# Patient Record
Sex: FEMALE | Race: WHITE | Employment: PART TIME | URBAN - METROPOLITAN AREA
[De-identification: names, ages, dates, MRNs, and addresses within clinical notes are randomized per-mention and may not be internally consistent; named-entity substitution may affect disease eponyms.]

---

## 2020-10-27 ENCOUNTER — OFFICE VISIT (OUTPATIENT)
Dept: URGENT CARE | Facility: URGENT CARE | Age: 20
End: 2020-10-27
Payer: COMMERCIAL

## 2020-10-27 VITALS
HEART RATE: 117 BPM | WEIGHT: 161.1 LBS | TEMPERATURE: 99.3 F | SYSTOLIC BLOOD PRESSURE: 132 MMHG | OXYGEN SATURATION: 100 % | DIASTOLIC BLOOD PRESSURE: 76 MMHG

## 2020-10-27 DIAGNOSIS — R42 LIGHTHEADEDNESS: Primary | ICD-10-CM

## 2020-10-27 DIAGNOSIS — R30.0 DYSURIA: ICD-10-CM

## 2020-10-27 DIAGNOSIS — R00.0 TACHYCARDIA: ICD-10-CM

## 2020-10-27 LAB
ALBUMIN UR-MCNC: NEGATIVE MG/DL
ANION GAP SERPL CALCULATED.3IONS-SCNC: 8 MMOL/L (ref 3–14)
APPEARANCE UR: NORMAL
BACTERIA #/AREA URNS HPF: ABNORMAL /HPF
BASOPHILS # BLD AUTO: 0.1 10E9/L (ref 0–0.2)
BASOPHILS NFR BLD AUTO: 0.6 %
BILIRUB UR QL STRIP: NEGATIVE
BUN SERPL-MCNC: 8 MG/DL (ref 7–30)
CALCIUM SERPL-MCNC: 9.3 MG/DL (ref 8.5–10.1)
CHLORIDE SERPL-SCNC: 103 MMOL/L (ref 94–109)
CO2 SERPL-SCNC: 27 MMOL/L (ref 20–32)
COLOR UR AUTO: YELLOW
CREAT SERPL-MCNC: 0.9 MG/DL (ref 0.52–1.04)
DIFFERENTIAL METHOD BLD: ABNORMAL
EOSINOPHIL # BLD AUTO: 0.2 10E9/L (ref 0–0.7)
EOSINOPHIL NFR BLD AUTO: 2.1 %
ERYTHROCYTE [DISTWIDTH] IN BLOOD BY AUTOMATED COUNT: 12.7 % (ref 10–15)
GFR SERPL CREATININE-BSD FRML MDRD: >90 ML/MIN/{1.73_M2}
GLUCOSE SERPL-MCNC: 121 MG/DL (ref 70–99)
GLUCOSE UR STRIP-MCNC: NEGATIVE MG/DL
HCG UR QL: NEGATIVE
HCT VFR BLD AUTO: 40 % (ref 35–47)
HGB BLD-MCNC: 13.3 G/DL (ref 11.7–15.7)
HGB UR QL STRIP: NEGATIVE
KETONES UR STRIP-MCNC: NEGATIVE MG/DL
LEUKOCYTE ESTERASE UR QL STRIP: NEGATIVE
LYMPHOCYTES # BLD AUTO: 2.8 10E9/L (ref 0.8–5.3)
LYMPHOCYTES NFR BLD AUTO: 24.6 %
MCH RBC QN AUTO: 31.1 PG (ref 26.5–33)
MCHC RBC AUTO-ENTMCNC: 33.3 G/DL (ref 31.5–36.5)
MCV RBC AUTO: 94 FL (ref 78–100)
MONOCYTES # BLD AUTO: 0.8 10E9/L (ref 0–1.3)
MONOCYTES NFR BLD AUTO: 7.2 %
NEUTROPHILS # BLD AUTO: 7.5 10E9/L (ref 1.6–8.3)
NEUTROPHILS NFR BLD AUTO: 65.5 %
NITRATE UR QL: NEGATIVE
NON-SQ EPI CELLS #/AREA URNS LPF: ABNORMAL /LPF
PH UR STRIP: 6.5 PH (ref 5–7)
PLATELET # BLD AUTO: 402 10E9/L (ref 150–450)
POTASSIUM SERPL-SCNC: 3.5 MMOL/L (ref 3.4–5.3)
RBC # BLD AUTO: 4.28 10E12/L (ref 3.8–5.2)
RBC #/AREA URNS AUTO: ABNORMAL /HPF
SODIUM SERPL-SCNC: 138 MMOL/L (ref 133–144)
SOURCE: NORMAL
SP GR UR STRIP: 1.01 (ref 1–1.03)
UROBILINOGEN UR STRIP-ACNC: 0.2 EU/DL (ref 0.2–1)
WBC # BLD AUTO: 11.5 10E9/L (ref 4–11)
WBC #/AREA URNS AUTO: ABNORMAL /HPF

## 2020-10-27 PROCEDURE — 93000 ELECTROCARDIOGRAM COMPLETE: CPT | Performed by: FAMILY MEDICINE

## 2020-10-27 PROCEDURE — 36415 COLL VENOUS BLD VENIPUNCTURE: CPT | Performed by: STUDENT IN AN ORGANIZED HEALTH CARE EDUCATION/TRAINING PROGRAM

## 2020-10-27 PROCEDURE — 80048 BASIC METABOLIC PNL TOTAL CA: CPT | Performed by: STUDENT IN AN ORGANIZED HEALTH CARE EDUCATION/TRAINING PROGRAM

## 2020-10-27 PROCEDURE — 81025 URINE PREGNANCY TEST: CPT | Performed by: STUDENT IN AN ORGANIZED HEALTH CARE EDUCATION/TRAINING PROGRAM

## 2020-10-27 PROCEDURE — 85025 COMPLETE CBC W/AUTO DIFF WBC: CPT | Performed by: STUDENT IN AN ORGANIZED HEALTH CARE EDUCATION/TRAINING PROGRAM

## 2020-10-27 PROCEDURE — 99203 OFFICE O/P NEW LOW 30 MIN: CPT | Performed by: FAMILY MEDICINE

## 2020-10-27 PROCEDURE — 81001 URINALYSIS AUTO W/SCOPE: CPT | Performed by: STUDENT IN AN ORGANIZED HEALTH CARE EDUCATION/TRAINING PROGRAM

## 2020-10-27 RX ORDER — TESTOSTERONE CYPIONATE 200 MG/ML
INJECTION, SOLUTION INTRAMUSCULAR
COMMUNITY
Start: 2019-03-27 | End: 2020-10-27

## 2020-10-27 RX ORDER — HYDROXYZINE HYDROCHLORIDE 25 MG/1
25-50 TABLET, FILM COATED ORAL
COMMUNITY
Start: 2019-04-05 | End: 2020-10-27

## 2020-10-27 RX ORDER — VENLAFAXINE HYDROCHLORIDE 75 MG/1
CAPSULE, EXTENDED RELEASE ORAL
COMMUNITY
Start: 2019-04-05 | End: 2020-10-27

## 2020-10-27 RX ORDER — FLUOXETINE 10 MG/1
10 CAPSULE ORAL DAILY
COMMUNITY

## 2020-10-27 RX ORDER — ARIPIPRAZOLE 20 MG/1
20 TABLET ORAL DAILY
COMMUNITY

## 2020-10-27 NOTE — PROGRESS NOTES
"SUBJECTIVE:   Jennifer Alvarez is a 20 year old female presenting with a chief complaint of   Chief Complaint   Patient presents with     LIGHTHEADED X 2 MONTHS     She is a new patient of [facility].    Lightheadedness when standing for the past 2 months  Got out of bed one morning and \"completely blind sided me\" initially  Lightheadedness to the point where vision went black   Occurs daily , worse in the mornings (when getting up from lying flat)   Lasts a few seconds   No syncope  No vomiting  No chest pain or shortness of breath   No palpitations   Occasional nausea  New cramping sensation along ab muscles for the past 4 days - constantly   No fever or chills   Last BM was a few hours ago and was normal, soft and no blood   No vertigo   Never happened before prior to the past two months   Things are \"about the same\"   Sexually active with male partner - 3 weeks ago, use condom regularly   LMP was Oct 3, normal in duration and flow , has monthly periods  No previous hx of anemia   No recent illness   Eating and drinking normally   Dysuria reported just after urination for the past 4-5 days   Mild back pain     Recently started prozac one week ago   Taking Abilify since June 2020    Review of Systems   See hpi    No past medical history on file.  No family history on file.  Current Outpatient Medications   Medication Sig Dispense Refill     ARIPiprazole (ABILIFY) 20 MG tablet Take 20 mg by mouth daily       FLUoxetine (PROZAC) 10 MG capsule Take 10 mg by mouth daily       Social History     Tobacco Use     Smoking status: Not on file   Substance Use Topics     Alcohol use: Not on file     OBJECTIVE  /76   Pulse 117   Temp 99.3  F (37.4  C)   Wt 73.1 kg (161 lb 1.6 oz)   SpO2 100%     Physical Exam  Vitals signs reviewed.   Constitutional:       General: She is not in acute distress.     Appearance: Normal appearance. She is not ill-appearing, toxic-appearing or diaphoretic.   HENT:      Head: " Normocephalic and atraumatic.   Eyes:      General: No scleral icterus.     Extraocular Movements: Extraocular movements intact.      Conjunctiva/sclera: Conjunctivae normal.      Pupils: Pupils are equal, round, and reactive to light.   Cardiovascular:      Rate and Rhythm: Regular rhythm. Tachycardia present.      Pulses: Normal pulses.      Heart sounds: Normal heart sounds. No murmur.   Pulmonary:      Effort: Pulmonary effort is normal. No respiratory distress.      Breath sounds: Normal breath sounds. No wheezing or rhonchi.   Abdominal:      General: Abdomen is flat. There is no distension.      Palpations: Abdomen is soft.      Tenderness: There is no abdominal tenderness. There is no guarding or rebound.   Skin:     General: Skin is warm and dry.      Capillary Refill: Capillary refill takes less than 2 seconds.      Coloration: Skin is not jaundiced.   Neurological:      Mental Status: She is alert and oriented to person, place, and time.   Psychiatric:         Mood and Affect: Mood normal.         Behavior: Behavior normal.         Thought Content: Thought content normal.         Judgment: Judgment normal.       Labs:  No results found for this or any previous visit (from the past 24 hour(s)).    X-Ray was not done.    ASSESSMENT:  Medical decision making:   Patient is a 20-year-old female otherwise healthy with past medical history of depression presenting with lightheadedness with sudden standing for 2 months duration.  Patient presents to urgent care today due to continued symptoms and mother asking for hemoglobin check.  Vitals here show temp of 99.3, pulse of 117, blood pressure 132/76, satting 100% on room air.  She looks nontoxic and not ill-appearing today.  Initial differential includes but is not limited to dehydration, orthostatic hypotension, cardiac etiology, electrolyte abnormalities, PE (no recent travel, well appearing), vasovagal, anxiety, illness (UTI would be highest on differential as  she reports dysuria for 4 days, unlikely PNA with clear lung exam and nontoxic appearing), POTS, drug use (denies), med related side effects (selective serotonin reuptake inhibitor). No heart etiology symptoms but is tachycardic today. Will get BMP, CBC, UA, UPT. Orthostatics and EKG also pending.    Orthostatics in clinic today:  Lying 131/75 p 102   Sitting 139/79 p 117  Standing 126/82 p 120    EKG today: No ectopy or arrhythmia. Sinus tachycardia, normal axis. No previous EKG to compare.     On reevaluation, she is feeling normal and well overall, HR still in low 100s. We reviewed labs as following:   Labs: UA showed no sign of infection, no LE, no nitrite, no proteinuria, 0-5 WBC, 0-2 RBCs, mod bacteria.   UPT neg  CBC showed very mild leukocytosis of 11.5, hgb is normal at 13.3.   BMP showed normal electrolytes, glucose 121, normal Cr.      Unclear etiology. Ruled out hemorrhage/anemia, cardiac arrhythmia, pregnancy, electrolyte abnormality, UTI, orthostatic hypotension. She reports feeling anxious about coming into  today and noticed that her HR came up and this could explain her tachycardia in clinic as she was asymptomatic (no lightheadedness in clinic). Likely situational/presyncopal symptoms due to medication related (new Prozac addition) vs anxiety in etiology. Will have patient discuss new med (prozac) with PCP as this could cause orthostatic hypotension symptoms. If still an issue, return to  for evaluation. If occurring at rest, encourage ED eval.       ICD-10-CM    1. Lightheadedness  R42 HCG Qual, Urine ([id number])     CBC with platelets and differential     Basic metabolic panel  (Ca, Cl, CO2, Creat, Gluc, K, Na, BUN)     UA reflex to Microscopic     EKG 12-lead complete w/read - Clinics   2. Dysuria  R30.0 HCG Qual, Urine ([id number])     UA reflex to Microscopic   3. Tachycardia  R00.0 CBC with platelets and differential     EKG 12-lead complete w/read - Clinics      PLAN:    -Lightheadedness:  stay hydrated, eat regular meals/salty meals, drink plenty of water, stand up slowly. Talk to PCP about new medication as sometimes SSRIs can cause lightheadedness. Consider Holter monitor in future if symptoms continue. See above.     Followup:  If not improving or if condition worsens, follow up with your Primary Care Provider (SEE ABOVE), If not improving or if conditions worsens over the next 12-24 hours, go to the Emergency Department    Yusef Castro MD    PPE used: N95, face mask    There are no Patient Instructions on file for this visit.    Yusef Castro MD

## 2020-10-28 NOTE — PATIENT INSTRUCTIONS
Patient Education     -Lightheadedness: stay hydrated, eat regular meals/salty meals, drink plenty of water, stand up slowly. Talk to PCP about new medication as sometimes SSRIs can cause lightheadedness. Consider Holter monitor in future if symptoms continue.   Dizziness (Uncertain Cause)  Dizziness is a common symptom. It may be described as lightheadedness, spinning, or feeling like you are going to faint. Dizziness can have many causes.  Be sure to tell the healthcare provider about:    All medicines you take, including prescription, over-the-counter, herbs, and supplements    Any other symptoms you have    Any health problems you are being treated for    Any past major health problems you've had, such as a heart attack, balance issues, hearing problems, or blood pressure problems    Anything that causes the dizziness to get worse or better  Today's exam did not show an exact cause for your dizziness. Other tests may be needed. Follow up with your healthcare provider.  Home care    Dizziness that occurs with sudden standing may be a sign of mild dehydration. Drink extra fluids for the next few days.    If you recently started a new medicine, stopped a medicine, or had the dose of a current medicine changed, talk with the prescribing healthcare provider. Your medicine plan may need adjustment.    If dizziness lasts more than a few seconds, sit or lie down until it passes. This may help prevent injury in case you pass out. Get up slowly when you feel better.    Don't drive or use power tools or dangerous equipment until you have had no dizziness for at least 48 hours.  Follow-up care  Follow up with your healthcare provider for further evaluation within the next 7 days or as advised.  When to seek medical advice  Call your healthcare provider for any of the following:    Worsening of symptoms or new symptoms    Passing out or seizure    Repeated vomiting    Headache    Palpitations (the sense that your heart is  fluttering or beating fast or hard)    Shortness of breath    Blood in vomit or stool (black or red color)    Weakness of an arm or leg or 1 side of the face    Vision or hearing changes    Trouble walking or speaking    Chest, arm, neck, back, or jaw pain  Date Last Reviewed: 11/1/2017 2000-2019 [email or web address]. [address] [place] [address]. All rights reserved. This information is not intended as a substitute for professional medical care. Always follow your healthcare professional's instructions.